# Patient Record
Sex: MALE | Race: WHITE | Employment: OTHER | URBAN - NONMETROPOLITAN AREA
[De-identification: names, ages, dates, MRNs, and addresses within clinical notes are randomized per-mention and may not be internally consistent; named-entity substitution may affect disease eponyms.]

---

## 2022-10-14 PROBLEM — I65.29 CAROTID ARTERY STENOSIS: Status: ACTIVE | Noted: 2022-10-14

## 2022-10-14 PROBLEM — N18.30 CHRONIC KIDNEY DISEASE, STAGE 3 (HCC): Status: ACTIVE | Noted: 2022-10-14

## 2022-10-14 PROBLEM — I35.9 AORTIC VALVE DISORDER: Status: ACTIVE | Noted: 2022-10-14

## 2022-10-14 PROBLEM — M19.90 OSTEOARTHRITIS: Status: ACTIVE | Noted: 2022-10-14

## 2022-10-14 PROBLEM — I10 HYPERTENSION: Status: ACTIVE | Noted: 2022-10-14

## 2022-10-14 PROBLEM — E78.00 HYPERCHOLESTEREMIA: Status: ACTIVE | Noted: 2022-10-14

## 2022-10-14 PROBLEM — E03.9 HYPOTHYROIDISM: Status: ACTIVE | Noted: 2022-10-14

## 2022-10-18 ENCOUNTER — OFFICE VISIT (OUTPATIENT)
Dept: NEUROLOGY | Age: 87
End: 2022-10-18

## 2022-10-18 ENCOUNTER — HOSPITAL ENCOUNTER (OUTPATIENT)
Dept: MRI IMAGING | Age: 87
Discharge: HOME | End: 2022-10-18

## 2022-10-18 VITALS
HEIGHT: 73 IN | SYSTOLIC BLOOD PRESSURE: 138 MMHG | OXYGEN SATURATION: 97 % | DIASTOLIC BLOOD PRESSURE: 60 MMHG | WEIGHT: 185 LBS | BODY MASS INDEX: 24.52 KG/M2 | HEART RATE: 49 BPM

## 2022-10-18 DIAGNOSIS — Z00.6 EXAMINATION FOR NORMAL COMPARISON FOR CLINICAL RESEARCH: ICD-10-CM

## 2022-10-18 DIAGNOSIS — I65.22 INTERNAL CAROTID ARTERY STENOSIS, LEFT: Primary | ICD-10-CM

## 2022-10-18 RX ORDER — LOSARTAN POTASSIUM AND HYDROCHLOROTHIAZIDE 12.5; 5 MG/1; MG/1
1 TABLET ORAL DAILY
COMMUNITY

## 2022-10-18 RX ORDER — LEVOTHYROXINE SODIUM 0.1 MG/1
100 TABLET ORAL DAILY
COMMUNITY

## 2022-10-18 RX ORDER — DONEPEZIL HYDROCHLORIDE 10 MG/1
10 TABLET, FILM COATED ORAL NIGHTLY
COMMUNITY

## 2022-10-18 RX ORDER — ASPIRIN 81 MG/1
81 TABLET ORAL DAILY
COMMUNITY

## 2022-10-18 ASSESSMENT — ENCOUNTER SYMPTOMS
NAUSEA: 0
SHORTNESS OF BREATH: 0
VOMITING: 0
RHINORRHEA: 0
COUGH: 0
SORE THROAT: 0
ABDOMINAL PAIN: 0

## 2022-10-18 NOTE — PROGRESS NOTES
Neurointerventional Consult Note    Date:10/18/2022         Patient Name:Izaiah Avery     YOB: 1935     Age:87 y.o. Requesting Physician: Dr. Elsy Montano    Reason for Consult:  Evaluate for left internal carotid artery stenosis    Chief Complaint: left internal carotid artery stenosis    Subjective     Ashwin Joyce is a 81 y.o. male who presents to the interventional neurology clinic as a new patient for evaluation and management of left internal carotid artery stenosis. He was referred to our service by Dr. Kody Monahan for further evaluation. Past medical history is significant for TIA, CKD3, HLD, HTN, dementia, carotid artery stenosis, aortic valve disorder s/p valve replacement, and hypothyroidism. Patient is accompanied by his daughter who reports initially seeing Dr. Kody Monahan for memory issues the patient was experiencing. Dr. Kody Monahan noticed \"carotid artery stenosis\" listed in his chart, possibly added by PCP, and subsequently ordered vascular imaging to confirm/assess. However, CKD3 is also listed on past medical history, so CTA imaging was deferred and MRA head and neck were completed, revealing high-grade stenosis of L ICA >75%, occlusion of left external carotid artery, and approximately 50% stenosis of R ICA. No significant intracranial stenosis noted. Today, patient and daughter deny known history of CKD. Patient's daughter brought labs that were done in September 2022 - BUN 14, Cr 1.0. LDL 86. Patient is on ASA 81 mg daily, as well as Eliquis 5 mg BID for history of aortic valve replacement. Denies history of cardiac stenting/CABG. He is a former smoker, but does not currently smoke. Patient denies any history of radiation to the left side of his neck. Patient denies symptoms today - no dizziness, vision changes, headaches, difficulty speaking or understanding, numbness or tingling, tremors, unilateral weakness. Denies chest pain or respiratory distress.      Review of Systems Review of Systems   Constitutional:  Negative for chills, fatigue and fever. HENT:  Negative for rhinorrhea and sore throat. Eyes:  Negative for visual disturbance. Respiratory:  Negative for cough and shortness of breath. Cardiovascular:  Negative for chest pain and palpitations. Gastrointestinal:  Negative for abdominal pain, nausea and vomiting. Genitourinary:  Negative for dysuria. Musculoskeletal:  Negative for arthralgias and myalgias. Skin:  Negative for rash. Neurological:  Negative for dizziness, tremors, speech difficulty, weakness, numbness and headaches. Psychiatric/Behavioral:  Positive for confusion. The patient is not nervous/anxious. Medications     Current Outpatient Medications on File Prior to Visit   Medication Sig Dispense Refill    apixaban (ELIQUIS) 5 MG TABS tablet Take 5 mg by mouth 2 times daily      aspirin 81 MG EC tablet Take 81 mg by mouth daily      donepezil (ARICEPT) 10 MG tablet Take 10 mg by mouth nightly      levothyroxine (SYNTHROID) 100 MCG tablet Take 100 mcg by mouth Daily      losartan-hydroCHLOROthiazide (HYZAAR) 50-12.5 MG per tablet Take 1 tablet by mouth daily      etodolac (LODINE) 300 MG capsule Take 300 mg by mouth in the morning and 300 mg at noon and 300 mg in the evening. B Complex-C (B COMPLEX-VITAMIN C PO) Take by mouth       No current facility-administered medications on file prior to visit. Past History    Past Medical History:   has a past medical history of Aortic valve disorder, Carotid artery stenosis, Chronic kidney disease, stage 3 ([address] Hypercholesteremia, Hypertension, Hypothyroidism, and Osteoarthritis. Social History:   reports that he has quit smoking. His smoking use included cigarettes. He has a 40.00 pack-year smoking history. He does not have any smokeless tobacco history on file. He reports that he does not drink alcohol and does not use drugs.      Family History:   Family History   Problem Relation Age of Onset    Heart Failure Father     Diabetes Father     Other Father     Hypertension Brother        Physical Examination      Vitals:  /60 (Site: Left Upper Arm, Position: Sitting, Cuff Size: Large Adult)   Pulse (!) 49   Ht 6' 1\" (1.854 m)   Wt 185 lb (83.9 kg)   SpO2 97%   BMI 24.41 kg/m²       Physical Exam  Vitals reviewed. Constitutional:       General: He is not in acute distress. Appearance: Normal appearance. He is not ill-appearing. HENT:      Head: Normocephalic and atraumatic. Right Ear: External ear normal.      Left Ear: External ear normal.      Nose: Nose normal.      Mouth/Throat:      Mouth: Mucous membranes are moist.      Pharynx: No oropharyngeal exudate or posterior oropharyngeal erythema. Eyes:      Extraocular Movements: Extraocular movements intact and EOM normal.      Pupils: Pupils are equal, round, and reactive to light. Cardiovascular:      Rate and Rhythm: Normal rate and regular rhythm. Heart sounds: Normal heart sounds. No murmur heard. Comments: Loud mechanical heart valve  Pulmonary:      Effort: Pulmonary effort is normal. No respiratory distress. Breath sounds: Normal breath sounds. No wheezing. Abdominal:      General: Bowel sounds are normal.      Palpations: Abdomen is soft. Tenderness: There is no abdominal tenderness. Musculoskeletal:         General: Normal range of motion. Cervical back: Normal range of motion. Right lower leg: No edema. Left lower leg: No edema. Skin:     General: Skin is warm. Findings: No rash. Neurological:      Mental Status: He is alert and oriented to person, place, and time. He is confused.       Motor: Motor strength is normal.      Coordination: Finger-Nose-Finger Test and Heel to Shin Test normal.   Psychiatric:         Mood and Affect: Mood normal.         Speech: Speech normal.         Behavior: Behavior normal.     Neurologic Exam     Mental Status   Oriented to person, place, and time. Follows 1 step commands. Attention: normal. Concentration: normal.   Speech: speech is normal   Level of consciousness: alert  Abnormal comprehension. Cranial Nerves     CN II   Visual fields full to confrontation. Right visual field deficit: none  Left visual field deficit: none     CN III, IV, VI   Pupils are equal, round, and reactive to light. Extraocular motions are normal.   Right pupil: Shape: regular. Reactivity: brisk. Left pupil: Shape: regular. Reactivity: brisk. CN V   Facial sensation intact. Right facial sensation deficit: none  Left facial sensation deficit: none    CN VII   Facial expression full, symmetric. Right facial weakness: none  Left facial weakness: none    CN VIII   CN VIII normal.   Hearing: intact    CN IX, X   CN IX normal.   CN X normal.   Palate: symmetric    CN XI   CN XI normal.   Right trapezius strength: normal  Left trapezius strength: normal    CN XII   CN XII normal.   Tongue: not atrophic  Fasciculations: absent  Tongue deviation: none    Motor Exam   Right arm pronator drift: absent  Left arm pronator drift: absent    Strength   Strength 5/5 throughout. Sensory Exam   Light touch normal.     Gait, Coordination, and Reflexes     Coordination   Finger to nose coordination: normal  Heel to shin coordination: normal    Tremor   Resting tremor: absent  Action tremor: right arm     Imaging   MRA Head & Neck 10/10/2022:    -high grade stenosis of left ICA in the region of the bulb. The stenosis appears to measure greater than 75%  -occlusion of the left ECA  -approximately 50% narrowing of the R ICA in the region of the bulb  -no significant abnormality of the intracranial circulation    Assessment and Plan:        Left internal carotid artery stenosis  Recommend CTA of the neck for more accurate evaluation of left internal carotid artery stenosis. CTA neck ordered.    Last BUN 14, Cr 1.0 in September 2022  If left internal carotid artery stenosis >80%, would recommend stent placement. Our office will call patient with results and further recommendations, no additional clinic visit needed. If >80% stenosis, can schedule for stent placement by phone. Patient and daughter would like time to discuss whether or not to complete further vascular imaging. Patient/daughter to call our office once a decision is made. If deciding against further vascular imaging, no need for further outpatient neuro interventional follow-up. Discussed 18% risk of stroke each year with this amount of stenosis. Patient and daughter express verbal understanding. Continue Eliquis 5 BID and ASA 81 mg daily. Patient/daughter's questions answered with verbal expression of understanding. Advised to call office with any questions or concerns and to follow up urgently in ED if experiencing any focal neurologic deficits. This patient was seen and evaluated with Dr. Tera Menjivar and he is in agreement with the assessment and plan.     Electronically signed by Geraldo Hernandez PA-C on 10/18/22 at 4:47 PM EDT